# Patient Record
Sex: FEMALE | Race: WHITE | NOT HISPANIC OR LATINO
[De-identification: names, ages, dates, MRNs, and addresses within clinical notes are randomized per-mention and may not be internally consistent; named-entity substitution may affect disease eponyms.]

---

## 2021-07-27 PROBLEM — Z00.00 ENCOUNTER FOR PREVENTIVE HEALTH EXAMINATION: Status: ACTIVE | Noted: 2021-07-27

## 2021-08-17 ENCOUNTER — APPOINTMENT (OUTPATIENT)
Dept: NEUROLOGY | Facility: CLINIC | Age: 46
End: 2021-08-17
Payer: COMMERCIAL

## 2021-08-17 PROCEDURE — 99214 OFFICE O/P EST MOD 30 MIN: CPT

## 2021-08-17 NOTE — ASSESSMENT
[FreeTextEntry1] : Lisa is having significant number of headaches.  She needs to be on a preventative.\par We will begin topiramate.\par Side effects include, but are not limited to, decreased appetite, weight loss, numbness and tingling in the extremities, increased risk of kidney stones, word finding difficulties, and fatigue.\par \par \par For breakthrough pain, she can take Nurtec as needed.\par \par Maintain hydration of 2 L/day.\par \par Keep a headache diary.\par \par We will discuss meningioma on MRI with neurosurgery.\par \par Follow-up in 3 months.

## 2021-08-17 NOTE — PHYSICAL EXAM
[FreeTextEntry1] : Physical examination \par General: No acute distress, Awake, Alert. \par Fundus: Unable\par Neck: no Carotid bruit. \par Cardiovascular: Normal rate, Regular rhythm, No murmur. \par Chest - clear bilaterally\par \par Mental status \par Awake, alert, and oriented to person, time and place, Normal attention span and concentration, Recent and remote memory intact, Language intact, Fund of knowledge intact. \par Cranial Nerves \par II: VFF \par III, IV, VI: PERRL, EOMI. \par V: Facial sensation is normal B/L. \par VII: Facial strength is normal B/L. \par VIII: Gross hearing is intact. \par IX, X: Palate is midline and elevates symmetrically. \par XI: Trapezius normal strength. \par XII: Tongue midline without atrophy or fasciculations. \par \par Motor exam \par Muscle tone - no evidence of rigidity or resistance in all 4 extremities. \par No atrophy or fasciculations \par Muscle Strength: arms and legs, proximal and distal flexors and extensors are normal \par No UE drift.\par \par Reflexes \par All present, normal, and symmetrical. \par Plantars right: mute. \par Plantars left: mute. \par Absent ankle jerks. \par \par Coordination \par Finger to nose: Normal. \par Heel to shin: Normal. \par \par \par Gait \par Normal\par
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
## 2021-11-04 ENCOUNTER — RX RENEWAL (OUTPATIENT)
Age: 46
End: 2021-11-04

## 2021-11-16 ENCOUNTER — APPOINTMENT (OUTPATIENT)
Dept: NEUROLOGY | Facility: CLINIC | Age: 46
End: 2021-11-16
Payer: COMMERCIAL

## 2021-11-16 ENCOUNTER — NON-APPOINTMENT (OUTPATIENT)
Age: 46
End: 2021-11-16

## 2021-11-16 VITALS
SYSTOLIC BLOOD PRESSURE: 112 MMHG | HEART RATE: 99 BPM | TEMPERATURE: 97.1 F | DIASTOLIC BLOOD PRESSURE: 75 MMHG | WEIGHT: 138 LBS | BODY MASS INDEX: 27.82 KG/M2 | HEIGHT: 59 IN

## 2021-11-16 PROCEDURE — 99214 OFFICE O/P EST MOD 30 MIN: CPT

## 2021-11-16 RX ORDER — PROPRANOLOL HYDROCHLORIDE 10 MG/1
10 TABLET ORAL
Qty: 90 | Refills: 3 | Status: ACTIVE | COMMUNITY
Start: 2021-11-16 | End: 1900-01-01

## 2021-11-16 RX ORDER — RIZATRIPTAN BENZOATE 10 MG/1
10 TABLET ORAL
Qty: 12 | Refills: 1 | Status: DISCONTINUED | COMMUNITY
Start: 2021-08-25 | End: 2021-11-16

## 2021-11-16 RX ORDER — IBUPROFEN 600 MG/1
600 TABLET, FILM COATED ORAL
Qty: 30 | Refills: 0 | Status: ACTIVE | COMMUNITY
Start: 2021-07-26

## 2021-11-16 RX ORDER — TOPIRAMATE 25 MG/1
25 TABLET, FILM COATED ORAL
Qty: 60 | Refills: 3 | Status: DISCONTINUED | COMMUNITY
Start: 2021-08-17 | End: 2021-11-16

## 2021-11-16 RX ORDER — RIMEGEPANT SULFATE 75 MG/75MG
75 TABLET, ORALLY DISINTEGRATING ORAL
Qty: 16 | Refills: 3 | Status: DISCONTINUED | COMMUNITY
Start: 2021-08-17 | End: 2021-11-16

## 2021-11-16 RX ORDER — SUMATRIPTAN 50 MG/1
50 TABLET, FILM COATED ORAL
Qty: 6 | Refills: 0 | Status: DISCONTINUED | COMMUNITY
Start: 2021-03-02 | End: 2021-11-16
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
## 2021-11-16 NOTE — PHYSICAL EXAM
[FreeTextEntry1] : Physical examination \par General: No acute distress, Awake, Alert. \par \par Mental status \par Awake, alert, and oriented to person, time and place, Normal attention span and concentration, Recent and remote memory intact, Language intact, Fund of knowledge intact. \par Cranial Nerves \par II: VFF \par III, IV, VI: PERRL, EOMI. \par V: Facial sensation is normal B/L. \par VII: Facial strength is normal B/L. \par VIII: Gross hearing is intact. \par IX, X: Palate is midline and elevates symmetrically. \par XI: Trapezius normal strength. \par XII: Tongue midline without atrophy or fasciculations. \par \par Motor exam \par Muscle tone - no evidence of rigidity or resistance in all 4 extremities. \par No atrophy or fasciculations \par Muscle Strength: arms and legs, proximal and distal flexors and extensors are normal \par No UE drift.\par \par Gait \par Normal\par

## 2021-11-16 NOTE — ASSESSMENT
[FreeTextEntry1] : Lisa is having significant number of headaches.  She needs to be on a preventative.\par She will begin propranolol. \par \par For breakthrough pain, she can try frovatriptan.  Sumatriptan did not help her headaches at all.\par \par Maintain hydration of 2 L/day.\par \par Keep a headache diary.\par \par She will get evaluated by neurosurgery regarding the meningioma.\par \par Follow-up in 3 months.

## 2021-12-01 ENCOUNTER — APPOINTMENT (OUTPATIENT)
Dept: NEUROSURGERY | Facility: CLINIC | Age: 46
End: 2021-12-01
Payer: COMMERCIAL

## 2021-12-01 VITALS
BODY MASS INDEX: 28.22 KG/M2 | WEIGHT: 140 LBS | TEMPERATURE: 98.2 F | DIASTOLIC BLOOD PRESSURE: 73 MMHG | HEIGHT: 59 IN | SYSTOLIC BLOOD PRESSURE: 114 MMHG | HEART RATE: 86 BPM

## 2021-12-01 PROCEDURE — 99215 OFFICE O/P EST HI 40 MIN: CPT

## 2021-12-01 NOTE — END OF VISIT
[FreeTextEntry3] : I have seen the patient and reviewed the case together with PA and I agree with the final recommendations and plan of care.\par \par Jag Singh MD\par Neurosurgery\par \par  [Time Spent: ___ minutes] : I have spent [unfilled] minutes of time on the encounter. [>50% of the face to face encounter time was spent on counseling and/or coordination of care for ___] : Greater than 50% of the face to face encounter time was spent on counseling and/or coordination of care for [unfilled]

## 2021-12-01 NOTE — DATA REVIEWED
[de-identified] : Exam: MRI BRAIN [facility] IC \par Order#: [id number] \par \par \par \par History: HEADACHE, ABNORMAL CT. \par \par  MRI brain without and with intravenous contrast 03/01/2021 \par \par  Multiplanar MR imaging of brain obtained prior to and following intravenous ministration of \par contrast. The patient received 6cc of gadovist.The MRI study performed on a 1.5 Letitia magnet. \par \par  Comparison made to previous head CT and brain perfusion/CT studies 2/26/2021 \par \par  The midline sagittal structures including the pituitary, corpus callosum, pineal and craniocervical\par junction regions are unremarkable. \par \par  The ventricles are normal in size and configuration. Diffusion imaging reveals no acute or recent \par infarcts. There is nonspecific pattern of confluent and patchyT2/FLAIR hyperintensity within the \par periventricular white matter including foci abutting the atria of the lateral ventricles. The \par findings are nonspecific and may represent small vessel ischemic disease, sequela of migraines or \par Lyme disease, atypical pattern of demyelinating disease or previous inflammatory/infectious \par etiologies. There is no associated enhancement of the areas of T2/FLAIR hyperintensity within the \par brain. There is no hemorrhage. \par \par  There is a small T1 and T2 isointense homogeneously enhancing lesion along the right aspect of the \par falx at the convexity measuring 9 x 7 x 8 mm corresponding to suspected meningioma on CT scan. There\par are no other intracranial enhancing lesions. \par \par  The distal internal carotid and vertebral basilar artery flow-voids are intact. \par \par  There is no pathologic marrow placement. The visualized orbits are unremarkable. There is mild \par paranasal sinus mucosal disease. There is a 1.9 cm right maxillary sinus mucous retention cyst. \par \par \par  Impression: \par \par  No evidence of acute or recent infarct, edema or hemorrhage. \par \par  Nonspecific confluent and patchy areas of T2/FLAIR hyperintensity within the periventricular white \par matter with few foci abutting the atria of the lateral ventricles. The findings are nonspecific and \par may represent small vessel ischemic disease, sequela of migraines or Lyme disease, atypical pattern \par of demyelinating disease or previous inflammatory/infectious etiologies \par \par  No intracerebral enhancing lesions. \par \par  Small 9 mm right parafalcine enhancing meningioma vertex. \par \par ***Electronically Signed *** \par ----------------------------------------------- \par Tree Acosta MD 03/01/21 2004 \par \par Dictated on 03/01/21
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
## 2021-12-01 NOTE — ASSESSMENT
[FreeTextEntry1] : I have discussed the natural history and treatment options for intracranial meningiomas with the patient. I explained the different types of meningiomas and the indications for observation and imaging surveillance, medical management with antiepileptic medications and steroids, chemotherapy, radiation therapy, radiosurgery and surgery. I explained the indications of a combination of these treatment options.\par \par In the end, I recommend that there is no acute neurosurgical intervention indicated at this time. Patient noted to have a small right frontal parafalcine meningioma approximately 9 mm in length. Since her last MRI Brain was done in March 2021, I recommend she repeat an MRI Brain now to follow up on the meningioma. I provided patient education that meningiomas are slow growing in nature and that they have estrogen receptors on them. If she were to have any hormone replacement therapy or get pregnant, that may increase the size of it. Once MRI Brain is completed, I instructed the patient to follow up with us in clinic in January 2022 for image review.\par \par The patient understands the plan of care and is in agreement.  All questions answered to patient satisfaction.\par \par 
Satisfactory

## 2022-01-25 ENCOUNTER — APPOINTMENT (OUTPATIENT)
Dept: NEUROLOGY | Facility: CLINIC | Age: 47
End: 2022-01-25
Payer: COMMERCIAL

## 2022-01-25 ENCOUNTER — RESULT REVIEW (OUTPATIENT)
Age: 47
End: 2022-01-25

## 2022-01-25 VITALS
WEIGHT: 137 LBS | HEIGHT: 59 IN | TEMPERATURE: 97.5 F | SYSTOLIC BLOOD PRESSURE: 112 MMHG | DIASTOLIC BLOOD PRESSURE: 75 MMHG | HEART RATE: 56 BPM | BODY MASS INDEX: 27.62 KG/M2

## 2022-01-25 DIAGNOSIS — G43.119 MIGRAINE WITH AURA, INTRACTABLE, W/OUT STATUS MIGRAINOSUS: ICD-10-CM

## 2022-01-25 PROCEDURE — 99213 OFFICE O/P EST LOW 20 MIN: CPT

## 2022-01-25 RX ORDER — AMOXICILLIN AND CLAVULANATE POTASSIUM 875; 125 MG/1; MG/1
875-125 TABLET, COATED ORAL
Qty: 20 | Refills: 0 | Status: DISCONTINUED | COMMUNITY
Start: 2021-07-26 | End: 2022-01-25

## 2022-01-25 RX ORDER — FROVATRIPTAN SUCCINATE 2.5 MG/1
2.5 TABLET, FILM COATED ORAL
Qty: 15 | Refills: 3 | Status: DISCONTINUED | COMMUNITY
Start: 2021-11-16 | End: 2022-01-25

## 2022-01-25 RX ORDER — FLUTICASONE PROPIONATE 50 UG/1
50 SPRAY, METERED NASAL
Qty: 16 | Refills: 0 | Status: DISCONTINUED | COMMUNITY
Start: 2021-10-21 | End: 2022-01-25

## 2022-01-25 RX ORDER — CIPROFLOXACIN HYDROCHLORIDE 500 MG/1
500 TABLET, FILM COATED ORAL
Qty: 14 | Refills: 0 | Status: DISCONTINUED | COMMUNITY
Start: 2021-07-31 | End: 2022-01-25

## 2022-01-25 RX ORDER — SODIUM CHLORIDE 0.65 %
0.65 DROPS NASAL
Qty: 50 | Refills: 0 | Status: DISCONTINUED | COMMUNITY
Start: 2021-10-21 | End: 2022-01-25

## 2022-01-25 RX ORDER — CLOTRIMAZOLE 10 MG/G
1 CREAM TOPICAL
Qty: 15 | Refills: 0 | Status: DISCONTINUED | COMMUNITY
Start: 2021-05-19 | End: 2022-01-25

## 2022-01-25 RX ORDER — FLUCONAZOLE 150 MG/1
150 TABLET ORAL
Qty: 2 | Refills: 0 | Status: DISCONTINUED | COMMUNITY
Start: 2021-07-31 | End: 2022-01-25
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
## 2022-01-27 NOTE — ASSESSMENT
[FreeTextEntry1] : Lisa is doing very well on propranolol.  I recommend she continue this and maintain compliance of 30 mg at night.  She has no intolerance or side effects.   \par \par For breakthrough pain, she has tried frovatriptan and sumatriptan without relief.  She would like to try Nurtec.  She has had this in the past with sustained and good results.  \par \par Maintain hydration of 2 L/day.\par \par Keep a headache diary.\par \par She will get evaluated by neurosurgery regarding the meningioma.\par \par Follow-up in 6 months.

## 2022-03-02 ENCOUNTER — APPOINTMENT (OUTPATIENT)
Dept: NEUROSURGERY | Facility: CLINIC | Age: 47
End: 2022-03-02
Payer: COMMERCIAL

## 2022-03-02 VITALS
BODY MASS INDEX: 28.22 KG/M2 | HEIGHT: 59 IN | DIASTOLIC BLOOD PRESSURE: 77 MMHG | SYSTOLIC BLOOD PRESSURE: 113 MMHG | TEMPERATURE: 98.2 F | HEART RATE: 67 BPM | WEIGHT: 140 LBS | OXYGEN SATURATION: 99 %

## 2022-03-02 DIAGNOSIS — D32.9 BENIGN NEOPLASM OF MENINGES, UNSPECIFIED: ICD-10-CM

## 2022-03-02 PROCEDURE — 99215 OFFICE O/P EST HI 40 MIN: CPT

## 2022-03-02 NOTE — ASSESSMENT
[FreeTextEntry1] : I have discussed the natural history and treatment options for intracranial meningiomas with the patient. I explained the different types of meningiomas and the indications for observation and imaging surveillance, medical management with antiepileptic medications and steroids, chemotherapy, radiation therapy, radiosurgery and surgery. I explained the indications of a combination of these treatment options.  Upon review of imaging, the suspected meningioma remains stable in size and patient remains symptom-free.  We will repeat an MRI brain with and without gadolinium in one years time with an appointment to follow.  I have asked the patient to contact me for any symptomatic development or progression at which time we can obtain expedited follow up imaging.\par  \par
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
## 2022-03-02 NOTE — PHYSICAL EXAM
[FreeTextEntry1] : Constitutional: alert and in no acute distress. \par Psychiatric: oriented to person, place, and time, insight and judgment were intact and the affect was normal. \par \par Orientation: oriented to person, oriented to place and oriented to time. \par Memory: short term memory intact and remote memory intact. \par Attention: the attention span was normal and normal concentrating ability. \par Language: fluency intact and comprehension intact. \par Fund of knowledge: displays adequate knowledge of current events, adequate knowledge of personal past history and adequate range of vocabulary. \par Cranial Nerves: visual acuity intact bilaterally, pupils equal round and reactive to light, extraocular motion intact, facial sensation intact symmetrically, face symmetrical, hearing was intact bilaterally, tongue and palate midline, head turning and shoulder shrug symmetric and there was no tongue deviation with protrusion. \par Motor: muscle tone was normal in all four extremities, muscle strength was normal in all four extremities and normal bulk in all four extremities. \par Sensory exam: light touch was intact. \par Coordination:. normal gait. balance was intact. there was no past-pointing. no tremor present. \par Deep tendon reflexes: \par Biceps right 2+. Biceps left 2+.  \par Triceps right 2+. Triceps left 2+.  \par Patella right 2+. Patella left 2+.

## 2022-07-26 ENCOUNTER — APPOINTMENT (OUTPATIENT)
Dept: NEUROLOGY | Facility: CLINIC | Age: 47
End: 2022-07-26